# Patient Record
Sex: FEMALE | Race: WHITE
[De-identification: names, ages, dates, MRNs, and addresses within clinical notes are randomized per-mention and may not be internally consistent; named-entity substitution may affect disease eponyms.]

---

## 2021-07-10 ENCOUNTER — HOSPITAL ENCOUNTER (EMERGENCY)
Dept: HOSPITAL 26 - MED | Age: 26
Discharge: HOME | End: 2021-07-10
Payer: COMMERCIAL

## 2021-07-10 VITALS — DIASTOLIC BLOOD PRESSURE: 67 MMHG | SYSTOLIC BLOOD PRESSURE: 137 MMHG

## 2021-07-10 VITALS — SYSTOLIC BLOOD PRESSURE: 138 MMHG | DIASTOLIC BLOOD PRESSURE: 77 MMHG

## 2021-07-10 VITALS — BODY MASS INDEX: 33.66 KG/M2 | WEIGHT: 190 LBS | HEIGHT: 63 IN

## 2021-07-10 DIAGNOSIS — Z79.899: ICD-10-CM

## 2021-07-10 DIAGNOSIS — G51.0: Primary | ICD-10-CM

## 2022-01-30 ENCOUNTER — HOSPITAL ENCOUNTER (EMERGENCY)
Dept: HOSPITAL 26 - MED | Age: 27
Discharge: HOME | End: 2022-01-30
Payer: COMMERCIAL

## 2022-01-30 VITALS — BODY MASS INDEX: 36.68 KG/M2 | WEIGHT: 207 LBS | HEIGHT: 63 IN

## 2022-01-30 VITALS — DIASTOLIC BLOOD PRESSURE: 75 MMHG | SYSTOLIC BLOOD PRESSURE: 131 MMHG

## 2022-01-30 VITALS — SYSTOLIC BLOOD PRESSURE: 131 MMHG | DIASTOLIC BLOOD PRESSURE: 75 MMHG

## 2022-01-30 DIAGNOSIS — J06.9: Primary | ICD-10-CM

## 2022-01-30 DIAGNOSIS — Z20.822: ICD-10-CM

## 2022-01-30 PROCEDURE — U0003 INFECTIOUS AGENT DETECTION BY NUCLEIC ACID (DNA OR RNA); SEVERE ACUTE RESPIRATORY SYNDROME CORONAVIRUS 2 (SARS-COV-2) (CORONAVIRUS DISEASE [COVID-19]), AMPLIFIED PROBE TECHNIQUE, MAKING USE OF HIGH THROUGHPUT TECHNOLOGIES AS DESCRIBED BY CMS-2020-01-R: HCPCS

## 2022-01-30 PROCEDURE — 99283 EMERGENCY DEPT VISIT LOW MDM: CPT

## 2022-01-30 PROCEDURE — 87804 INFLUENZA ASSAY W/OPTIC: CPT

## 2022-01-30 NOTE — NUR
Patient discharged with v/s stable. Written and verbal after care instructions 
ABOUT VIRAL RESPIRATORY INFECTION AND UPPER RESPIRATORY INFECTION given and 
explained. 

Patient verbalized understanding. Ambulatory with steady gait. All questions 
addressed prior to discharge. Advised to follow up with PMD.